# Patient Record
Sex: MALE | Race: WHITE | NOT HISPANIC OR LATINO | URBAN - METROPOLITAN AREA
[De-identification: names, ages, dates, MRNs, and addresses within clinical notes are randomized per-mention and may not be internally consistent; named-entity substitution may affect disease eponyms.]

---

## 2017-01-04 ENCOUNTER — EMERGENCY (EMERGENCY)
Facility: HOSPITAL | Age: 77
LOS: 1 days | Discharge: HOME | End: 2017-01-04
Attending: EMERGENCY MEDICINE
Payer: MEDICARE

## 2017-01-04 VITALS
HEIGHT: 65 IN | TEMPERATURE: 99 F | HEART RATE: 105 BPM | SYSTOLIC BLOOD PRESSURE: 104 MMHG | DIASTOLIC BLOOD PRESSURE: 67 MMHG | OXYGEN SATURATION: 96 % | RESPIRATION RATE: 20 BRPM | WEIGHT: 149.91 LBS

## 2017-01-04 DIAGNOSIS — Z79.82 LONG TERM (CURRENT) USE OF ASPIRIN: ICD-10-CM

## 2017-01-04 DIAGNOSIS — J11.1 INFLUENZA DUE TO UNIDENTIFIED INFLUENZA VIRUS WITH OTHER RESPIRATORY MANIFESTATIONS: ICD-10-CM

## 2017-01-04 DIAGNOSIS — M79.1 MYALGIA: ICD-10-CM

## 2017-01-04 DIAGNOSIS — K21.9 GASTRO-ESOPHAGEAL REFLUX DISEASE WITHOUT ESOPHAGITIS: ICD-10-CM

## 2017-01-04 DIAGNOSIS — E78.5 HYPERLIPIDEMIA, UNSPECIFIED: ICD-10-CM

## 2017-01-04 DIAGNOSIS — I10 ESSENTIAL (PRIMARY) HYPERTENSION: ICD-10-CM

## 2017-01-04 PROCEDURE — 99284 EMERGENCY DEPT VISIT MOD MDM: CPT

## 2017-01-05 VITALS
HEART RATE: 84 BPM | SYSTOLIC BLOOD PRESSURE: 113 MMHG | OXYGEN SATURATION: 98 % | TEMPERATURE: 98 F | DIASTOLIC BLOOD PRESSURE: 63 MMHG | RESPIRATION RATE: 18 BRPM

## 2017-01-05 LAB
ALBUMIN SERPL ELPH-MCNC: 4.4 G/DL — SIGNIFICANT CHANGE UP (ref 3.3–5.2)
ALP SERPL-CCNC: 53 U/L — SIGNIFICANT CHANGE UP (ref 40–120)
ALT FLD-CCNC: 12 U/L — SIGNIFICANT CHANGE UP
ANION GAP SERPL CALC-SCNC: 17 MMOL/L — SIGNIFICANT CHANGE UP (ref 5–17)
AST SERPL-CCNC: 23 U/L — SIGNIFICANT CHANGE UP
BASOPHILS # BLD AUTO: 0 K/UL — SIGNIFICANT CHANGE UP (ref 0–0.2)
BASOPHILS NFR BLD AUTO: 0 % — SIGNIFICANT CHANGE UP (ref 0–2)
BILIRUB SERPL-MCNC: 0.9 MG/DL — SIGNIFICANT CHANGE UP (ref 0.4–2)
BUN SERPL-MCNC: 26 MG/DL — HIGH (ref 8–20)
CALCIUM SERPL-MCNC: 8.8 MG/DL — SIGNIFICANT CHANGE UP (ref 8.6–10.2)
CHLORIDE SERPL-SCNC: 94 MMOL/L — LOW (ref 98–107)
CO2 SERPL-SCNC: 24 MMOL/L — SIGNIFICANT CHANGE UP (ref 22–29)
CREAT SERPL-MCNC: 1.42 MG/DL — HIGH (ref 0.5–1.3)
EOSINOPHIL # BLD AUTO: 0 K/UL — SIGNIFICANT CHANGE UP (ref 0–0.5)
GLUCOSE SERPL-MCNC: 110 MG/DL — SIGNIFICANT CHANGE UP (ref 70–115)
HCT VFR BLD CALC: 36 % — LOW (ref 42–52)
HGB BLD-MCNC: 12.6 G/DL — LOW (ref 14–18)
LACTATE BLDV-MCNC: 1.4 MMOL/L — SIGNIFICANT CHANGE UP (ref 0.7–2)
LYMPHOCYTES # BLD AUTO: 0.6 K/UL — LOW (ref 1–4.8)
LYMPHOCYTES # BLD AUTO: 10 % — LOW (ref 20–55)
MAGNESIUM SERPL-MCNC: 2 MG/DL — SIGNIFICANT CHANGE UP (ref 1.8–2.5)
MCHC RBC-ENTMCNC: 31.8 PG — HIGH (ref 27–31)
MCHC RBC-ENTMCNC: 35 G/DL — SIGNIFICANT CHANGE UP (ref 32–36)
MCV RBC AUTO: 90.9 FL — SIGNIFICANT CHANGE UP (ref 80–94)
MONOCYTES # BLD AUTO: 1.7 K/UL — HIGH (ref 0–0.8)
MONOCYTES NFR BLD AUTO: 5 % — SIGNIFICANT CHANGE UP (ref 3–10)
NEUTROPHILS # BLD AUTO: 7.2 K/UL — SIGNIFICANT CHANGE UP (ref 1.8–8)
NEUTROPHILS NFR BLD AUTO: 83 % — HIGH (ref 37–73)
NEUTS BAND # BLD: 2 % — SIGNIFICANT CHANGE UP (ref 0–8)
PHOSPHATE SERPL-MCNC: 6 MG/DL — HIGH (ref 2.4–4.7)
PLAT MORPH BLD: NORMAL — SIGNIFICANT CHANGE UP
PLATELET # BLD AUTO: 192 K/UL — SIGNIFICANT CHANGE UP (ref 150–400)
POTASSIUM SERPL-MCNC: 4.4 MMOL/L — SIGNIFICANT CHANGE UP (ref 3.5–5.3)
POTASSIUM SERPL-SCNC: 4.4 MMOL/L — SIGNIFICANT CHANGE UP (ref 3.5–5.3)
PROT SERPL-MCNC: 7.8 G/DL — SIGNIFICANT CHANGE UP (ref 6.6–8.7)
RBC # BLD: 3.96 M/UL — LOW (ref 4.6–6.2)
RBC # FLD: 12.7 % — SIGNIFICANT CHANGE UP (ref 11–15.6)
RBC BLD AUTO: NORMAL — SIGNIFICANT CHANGE UP
SODIUM SERPL-SCNC: 135 MMOL/L — SIGNIFICANT CHANGE UP (ref 135–145)
WBC # BLD: 9.08 K/UL — SIGNIFICANT CHANGE UP (ref 4.8–10.8)
WBC # FLD AUTO: 9.08 K/UL — SIGNIFICANT CHANGE UP (ref 4.8–10.8)

## 2017-01-05 PROCEDURE — 83735 ASSAY OF MAGNESIUM: CPT

## 2017-01-05 PROCEDURE — 96374 THER/PROPH/DIAG INJ IV PUSH: CPT

## 2017-01-05 PROCEDURE — 84100 ASSAY OF PHOSPHORUS: CPT

## 2017-01-05 PROCEDURE — 71046 X-RAY EXAM CHEST 2 VIEWS: CPT

## 2017-01-05 PROCEDURE — 80053 COMPREHEN METABOLIC PANEL: CPT

## 2017-01-05 PROCEDURE — 96375 TX/PRO/DX INJ NEW DRUG ADDON: CPT

## 2017-01-05 PROCEDURE — 71020: CPT | Mod: 26

## 2017-01-05 PROCEDURE — 99284 EMERGENCY DEPT VISIT MOD MDM: CPT | Mod: 25

## 2017-01-05 PROCEDURE — 85027 COMPLETE CBC AUTOMATED: CPT

## 2017-01-05 PROCEDURE — 83605 ASSAY OF LACTIC ACID: CPT

## 2017-01-05 RX ORDER — SODIUM CHLORIDE 9 MG/ML
1000 INJECTION INTRAMUSCULAR; INTRAVENOUS; SUBCUTANEOUS ONCE
Qty: 0 | Refills: 0 | Status: COMPLETED | OUTPATIENT
Start: 2017-01-05 | End: 2017-01-05

## 2017-01-05 RX ORDER — KETOROLAC TROMETHAMINE 30 MG/ML
30 SYRINGE (ML) INJECTION ONCE
Qty: 0 | Refills: 0 | Status: DISCONTINUED | OUTPATIENT
Start: 2017-01-05 | End: 2017-01-05

## 2017-01-05 RX ORDER — ONDANSETRON 8 MG/1
4 TABLET, FILM COATED ORAL ONCE
Qty: 0 | Refills: 0 | Status: COMPLETED | OUTPATIENT
Start: 2017-01-05 | End: 2017-01-05

## 2017-01-05 RX ADMIN — SODIUM CHLORIDE 1000 MILLILITER(S): 9 INJECTION INTRAMUSCULAR; INTRAVENOUS; SUBCUTANEOUS at 01:44

## 2017-01-05 RX ADMIN — Medication 30 MILLIGRAM(S): at 01:44

## 2017-01-05 RX ADMIN — Medication 30 MILLIGRAM(S): at 02:16

## 2017-01-05 RX ADMIN — ONDANSETRON 4 MILLIGRAM(S): 8 TABLET, FILM COATED ORAL at 01:44

## 2017-01-05 RX ADMIN — SODIUM CHLORIDE 1000 MILLILITER(S): 9 INJECTION INTRAMUSCULAR; INTRAVENOUS; SUBCUTANEOUS at 04:20

## 2017-01-05 NOTE — ED PROVIDER NOTE - PROGRESS NOTE DETAILS
pt was re-assessed, appears more comfortable, appears better, will continue hydration results were reviewed and discussed, advised to stay hydrated, f/u with pcp in 1-2 days. if any symptoms worsen or any new symptoms occur, return to Ed, pt verbalized understanding.

## 2017-01-05 NOTE — ED PROVIDER NOTE - CONSTITUTIONAL, MLM
normal... Well appearing, well nourished, awake, alert, oriented to person, place, time/situation and in no apparent distress. tired looking

## 2017-01-05 NOTE — ED PROVIDER NOTE - CONDUCTED A DETAILED DISCUSSION WITH PATIENT AND/OR GUARDIAN REGARDING, MDM
lab results/need for outpatient follow-up/radiology results/return to ED if symptoms worsen, persist or questions arise

## 2017-01-05 NOTE — ED PROVIDER NOTE - OBJECTIVE STATEMENT
77 y/o male was sent to ED from urgent care, pt was originally place on Zpack a week ago for URI, last dose was today. went ot [facility] for f.u today, + temp was noted, CXR report states clear lungs but "unsure" and was + for Flu A.  Pt complaining of muscle aches/anorexia/feeling tired. Denies dizziness/N/V/HA/CP/SOB. was given APAP at [facility].

## 2017-01-05 NOTE — ED ADULT NURSE NOTE - OBJECTIVE STATEMENT
pt alert and awake x3, arrived to ED with flu-like symptoms, states was sent from [facility] for eval, pt denies chest pain/sob, LS clear, has non productive dry cough, states has been sick for a couple weeks.

## 2017-01-05 NOTE — ED PROVIDER NOTE - ATTENDING CONTRIBUTION TO CARE
Patient recently diagnosed with flu out of window for treatment c/o worsening body aches and generalized weakness.  Patient in no respiratory distress.  Vitals stable.  Xray negative for acute pathology.  He was instructed to follow-up with PMD.

## 2020-07-02 ENCOUNTER — INPATIENT (INPATIENT)
Facility: HOSPITAL | Age: 80
LOS: 0 days | Discharge: HOME | DRG: 311 | End: 2020-07-03
Attending: HOSPITALIST | Admitting: INTERNAL MEDICINE
Payer: MEDICARE

## 2020-07-02 VITALS
WEIGHT: 160.06 LBS | RESPIRATION RATE: 19 BRPM | HEART RATE: 50 BPM | HEIGHT: 65 IN | TEMPERATURE: 98 F | SYSTOLIC BLOOD PRESSURE: 115 MMHG | DIASTOLIC BLOOD PRESSURE: 48 MMHG

## 2020-07-02 LAB
ALBUMIN SERPL ELPH-MCNC: 4.2 G/DL — SIGNIFICANT CHANGE UP (ref 3.3–5.2)
ALP SERPL-CCNC: 47 U/L — SIGNIFICANT CHANGE UP (ref 40–120)
ALT FLD-CCNC: 18 U/L — SIGNIFICANT CHANGE UP
ANION GAP SERPL CALC-SCNC: 15 MMOL/L — SIGNIFICANT CHANGE UP (ref 5–17)
APPEARANCE UR: CLEAR — SIGNIFICANT CHANGE UP
AST SERPL-CCNC: 20 U/L — SIGNIFICANT CHANGE UP
BACTERIA # UR AUTO: ABNORMAL
BASE EXCESS BLDV CALC-SCNC: 1.1 MMOL/L — SIGNIFICANT CHANGE UP (ref -2–2)
BASOPHILS # BLD AUTO: 0.07 K/UL — SIGNIFICANT CHANGE UP (ref 0–0.2)
BASOPHILS NFR BLD AUTO: 0.8 % — SIGNIFICANT CHANGE UP (ref 0–2)
BILIRUB SERPL-MCNC: 0.3 MG/DL — LOW (ref 0.4–2)
BILIRUB UR-MCNC: NEGATIVE — SIGNIFICANT CHANGE UP
BUN SERPL-MCNC: 18 MG/DL — SIGNIFICANT CHANGE UP (ref 8–20)
CA-I SERPL-SCNC: 1.08 MMOL/L — LOW (ref 1.15–1.33)
CALCIUM SERPL-MCNC: 9.1 MG/DL — SIGNIFICANT CHANGE UP (ref 8.6–10.2)
CHLORIDE BLDV-SCNC: 108 MMOL/L — HIGH (ref 98–107)
CHLORIDE SERPL-SCNC: 103 MMOL/L — SIGNIFICANT CHANGE UP (ref 98–107)
CO2 SERPL-SCNC: 22 MMOL/L — SIGNIFICANT CHANGE UP (ref 22–29)
COLOR SPEC: YELLOW — SIGNIFICANT CHANGE UP
CREAT SERPL-MCNC: 1.11 MG/DL — SIGNIFICANT CHANGE UP (ref 0.5–1.3)
D DIMER BLD IA.RAPID-MCNC: <150 NG/ML DDU — SIGNIFICANT CHANGE UP
DIFF PNL FLD: ABNORMAL
EOSINOPHIL # BLD AUTO: 0.74 K/UL — HIGH (ref 0–0.5)
EOSINOPHIL NFR BLD AUTO: 8 % — HIGH (ref 0–6)
EPI CELLS # UR: SIGNIFICANT CHANGE UP
GAS PNL BLDV: 141 MMOL/L — SIGNIFICANT CHANGE UP (ref 135–145)
GAS PNL BLDV: SIGNIFICANT CHANGE UP
GAS PNL BLDV: SIGNIFICANT CHANGE UP
GLUCOSE BLDV-MCNC: 98 MG/DL — SIGNIFICANT CHANGE UP (ref 70–99)
GLUCOSE SERPL-MCNC: 102 MG/DL — HIGH (ref 70–99)
GLUCOSE UR QL: NEGATIVE MG/DL — SIGNIFICANT CHANGE UP
HCO3 BLDV-SCNC: 25 MMOL/L — SIGNIFICANT CHANGE UP (ref 21–29)
HCT VFR BLD CALC: 36.3 % — LOW (ref 39–50)
HCT VFR BLDA CALC: 39 — SIGNIFICANT CHANGE UP (ref 39–50)
HGB BLD CALC-MCNC: 12.8 G/DL — LOW (ref 13–17)
HGB BLD-MCNC: 12.8 G/DL — LOW (ref 13–17)
IMM GRANULOCYTES NFR BLD AUTO: 0.2 % — SIGNIFICANT CHANGE UP (ref 0–1.5)
KETONES UR-MCNC: NEGATIVE — SIGNIFICANT CHANGE UP
LACTATE BLDV-MCNC: 0.9 MMOL/L — SIGNIFICANT CHANGE UP (ref 0.5–2)
LEUKOCYTE ESTERASE UR-ACNC: NEGATIVE — SIGNIFICANT CHANGE UP
LIDOCAIN IGE QN: 41 U/L — SIGNIFICANT CHANGE UP (ref 22–51)
LYMPHOCYTES # BLD AUTO: 2.2 K/UL — SIGNIFICANT CHANGE UP (ref 1–3.3)
LYMPHOCYTES # BLD AUTO: 23.7 % — SIGNIFICANT CHANGE UP (ref 13–44)
MAGNESIUM SERPL-MCNC: 2.1 MG/DL — SIGNIFICANT CHANGE UP (ref 1.6–2.6)
MCHC RBC-ENTMCNC: 32.8 PG — SIGNIFICANT CHANGE UP (ref 27–34)
MCHC RBC-ENTMCNC: 35.3 GM/DL — SIGNIFICANT CHANGE UP (ref 32–36)
MCV RBC AUTO: 93.1 FL — SIGNIFICANT CHANGE UP (ref 80–100)
MONOCYTES # BLD AUTO: 0.82 K/UL — SIGNIFICANT CHANGE UP (ref 0–0.9)
MONOCYTES NFR BLD AUTO: 8.8 % — SIGNIFICANT CHANGE UP (ref 2–14)
NEUTROPHILS # BLD AUTO: 5.43 K/UL — SIGNIFICANT CHANGE UP (ref 1.8–7.4)
NEUTROPHILS NFR BLD AUTO: 58.5 % — SIGNIFICANT CHANGE UP (ref 43–77)
NITRITE UR-MCNC: NEGATIVE — SIGNIFICANT CHANGE UP
NT-PROBNP SERPL-SCNC: 1110 PG/ML — HIGH (ref 0–300)
OTHER CELLS CSF MANUAL: 17 ML/DL — LOW (ref 18–22)
PCO2 BLDV: 39 MMHG — SIGNIFICANT CHANGE UP (ref 35–50)
PH BLDV: 7.42 — SIGNIFICANT CHANGE UP (ref 7.32–7.43)
PH UR: 6 — SIGNIFICANT CHANGE UP (ref 5–8)
PHOSPHATE SERPL-MCNC: 3.5 MG/DL — SIGNIFICANT CHANGE UP (ref 2.4–4.7)
PLATELET # BLD AUTO: 217 K/UL — SIGNIFICANT CHANGE UP (ref 150–400)
PO2 BLDV: 116 MMHG — HIGH (ref 25–45)
POTASSIUM BLDV-SCNC: 3.8 MMOL/L — SIGNIFICANT CHANGE UP (ref 3.4–4.5)
POTASSIUM SERPL-MCNC: 4.1 MMOL/L — SIGNIFICANT CHANGE UP (ref 3.5–5.3)
POTASSIUM SERPL-SCNC: 4.1 MMOL/L — SIGNIFICANT CHANGE UP (ref 3.5–5.3)
PROT SERPL-MCNC: 6.6 G/DL — SIGNIFICANT CHANGE UP (ref 6.6–8.7)
PROT UR-MCNC: NEGATIVE MG/DL — SIGNIFICANT CHANGE UP
RBC # BLD: 3.9 M/UL — LOW (ref 4.2–5.8)
RBC # FLD: 12.5 % — SIGNIFICANT CHANGE UP (ref 10.3–14.5)
RBC CASTS # UR COMP ASSIST: SIGNIFICANT CHANGE UP /HPF (ref 0–4)
SAO2 % BLDV: 99 % — SIGNIFICANT CHANGE UP
SODIUM SERPL-SCNC: 140 MMOL/L — SIGNIFICANT CHANGE UP (ref 135–145)
SP GR SPEC: 1.01 — SIGNIFICANT CHANGE UP (ref 1.01–1.02)
TROPONIN T SERPL-MCNC: <0.01 NG/ML — SIGNIFICANT CHANGE UP (ref 0–0.06)
UROBILINOGEN FLD QL: NEGATIVE MG/DL — SIGNIFICANT CHANGE UP
WBC # BLD: 9.28 K/UL — SIGNIFICANT CHANGE UP (ref 3.8–10.5)
WBC # FLD AUTO: 9.28 K/UL — SIGNIFICANT CHANGE UP (ref 3.8–10.5)
WBC UR QL: SIGNIFICANT CHANGE UP

## 2020-07-02 PROCEDURE — 93010 ELECTROCARDIOGRAM REPORT: CPT

## 2020-07-02 PROCEDURE — 99285 EMERGENCY DEPT VISIT HI MDM: CPT | Mod: CS

## 2020-07-02 PROCEDURE — 71045 X-RAY EXAM CHEST 1 VIEW: CPT | Mod: 26

## 2020-07-02 NOTE — ED ADULT NURSE NOTE - CHPI ED NUR SYMPTOMS NEG
no congestion/no fever/no nausea/no syncope/no diaphoresis/no vomiting/no back pain/no chills/no shortness of breath

## 2020-07-02 NOTE — ED PROVIDER NOTE - CLINICAL SUMMARY MEDICAL DECISION MAKING FREE TEXT BOX
Pt with generalized weakness for the last few days, ECG with rate in the 40s, irregular, having sinus pauses or couplet PACs. Electrolytes WNL, first troponin negative, aside from bradycardia no other ischemic changes on ECG. Concern for sinus node dysfunction, will have cardiology evaluate to recommend further EP work up. Will keep on telemetry in observation. Lyme and tick panel sent to rule out lyme carditis.

## 2020-07-02 NOTE — ED PROVIDER NOTE - OBJECTIVE STATEMENT
79y/o M with PMHx of HTN (Metoprolol, HCTZ), HLD and GERD presents to the ED c/o dizziness which began a few days ago. Assoc. sx of fatigue and generalized weakness. Pt additionally c/o mild intermittent CP which feels like heart burn. No alleviating/exacerbating fx. Pt is currently Wes in 40's on monitor. Pt reports that he was bit by a tick, appreciated erythema but no target sign. He reports that he presented to  for sx, was recommended to f/u at ED for further evaul. No known sick/covid contacts. No prior cardiac hx. Denies SOB/BARCENAS, HA, urinary complaints or fever. 81y/o M with PMHx of HTN, HLD and GERD presents to the ED c/o weakness. He states that he has been feeling generally unwell for the past few days, with easy fatigue and general weakness. Does not recall a specific onset of symptoms or provoking factor. Endorses intermittent burning type chest pain, occasional palpitations. Seen initially at urgent care, referred to ED for cardiology evaluation. He has otherwise been in usual state of health, denies any recent illness. Reports a tick bite a few weeks ago, had some surrounding erythema but denies any target rash. No other cardiac history.

## 2020-07-02 NOTE — ED ADULT TRIAGE NOTE - CHIEF COMPLAINT QUOTE
80 year old male sent in by cardiologist secondary to episode of chest pain dry mouth dizziness with cough started yesterday as per patient he is feeling better as this time. Denies nausea and vomiting. 80 year old male sent in by cardiologist secondary to episode of chest pain dry mouth dizziness with cough started yesterday as per patient he is feeling better as this time. Denies nausea and vomiting. MD BLANCHARD notified of patient

## 2020-07-02 NOTE — ED ADULT NURSE NOTE - OBJECTIVE STATEMENT
mid sternal cp associated with dizziness intermittent since last night. denies symptoms at time of assessment. a and o x3. breathing even and unlabored. sinus dre, hr in 40-50s on cm. pt sitting calm in bed. will continue to monitor.

## 2020-07-02 NOTE — ED PROVIDER NOTE - CHPI ED SYMPTOMS NEG
no shortness of breath/No HA. No urinary complaints./no fever no shortness of breath/no fever/no chills

## 2020-07-02 NOTE — ED ADULT NURSE NOTE - PSH
Quality 110: Preventive Care And Screening: Influenza Immunization: Influenza Immunization previously received during influenza season
Quality 111:Pneumonia Vaccination Status For Older Adults: Pneumococcal Vaccination not Administered or Previously Received, Reason not Otherwise Specified
Detail Level: Detailed
No significant past surgical history

## 2020-07-02 NOTE — ED ADULT NURSE NOTE - CHIEF COMPLAINT QUOTE
80 year old male sent in by cardiologist secondary to episode of chest pain dry mouth dizziness with cough started yesterday as per patient he is feeling better as this time. Denies nausea and vomiting. MD BLANCHARD notified of patient

## 2020-07-03 VITALS — HEART RATE: 56 BPM

## 2020-07-03 DIAGNOSIS — R00.1 BRADYCARDIA, UNSPECIFIED: ICD-10-CM

## 2020-07-03 LAB
B BURGDOR C6 AB SER-ACNC: NEGATIVE — SIGNIFICANT CHANGE UP
B BURGDOR IGG+IGM SER-ACNC: 0.49 INDEX — SIGNIFICANT CHANGE UP (ref 0.01–0.89)
SARS-COV-2 RNA SPEC QL NAA+PROBE: SIGNIFICANT CHANGE UP
TROPONIN T SERPL-MCNC: <0.01 NG/ML — SIGNIFICANT CHANGE UP (ref 0–0.06)
TROPONIN T SERPL-MCNC: <0.01 NG/ML — SIGNIFICANT CHANGE UP (ref 0–0.06)

## 2020-07-03 PROCEDURE — 99285 EMERGENCY DEPT VISIT HI MDM: CPT | Mod: 25

## 2020-07-03 PROCEDURE — 83735 ASSAY OF MAGNESIUM: CPT

## 2020-07-03 PROCEDURE — 99233 SBSQ HOSP IP/OBS HIGH 50: CPT

## 2020-07-03 PROCEDURE — 82947 ASSAY GLUCOSE BLOOD QUANT: CPT

## 2020-07-03 PROCEDURE — 84100 ASSAY OF PHOSPHORUS: CPT

## 2020-07-03 PROCEDURE — 99236 HOSP IP/OBS SAME DATE HI 85: CPT | Mod: CS

## 2020-07-03 PROCEDURE — G0378: CPT

## 2020-07-03 PROCEDURE — 83605 ASSAY OF LACTIC ACID: CPT

## 2020-07-03 PROCEDURE — 81001 URINALYSIS AUTO W/SCOPE: CPT

## 2020-07-03 PROCEDURE — 85014 HEMATOCRIT: CPT

## 2020-07-03 PROCEDURE — 84484 ASSAY OF TROPONIN QUANT: CPT

## 2020-07-03 PROCEDURE — 86666 EHRLICHIA ANTIBODY: CPT

## 2020-07-03 PROCEDURE — 86617 LYME DISEASE ANTIBODY: CPT

## 2020-07-03 PROCEDURE — 84295 ASSAY OF SERUM SODIUM: CPT

## 2020-07-03 PROCEDURE — U0003: CPT

## 2020-07-03 PROCEDURE — 99223 1ST HOSP IP/OBS HIGH 75: CPT

## 2020-07-03 PROCEDURE — 93005 ELECTROCARDIOGRAM TRACING: CPT

## 2020-07-03 PROCEDURE — 82330 ASSAY OF CALCIUM: CPT

## 2020-07-03 PROCEDURE — 86753 PROTOZOA ANTIBODY NOS: CPT

## 2020-07-03 PROCEDURE — 83880 ASSAY OF NATRIURETIC PEPTIDE: CPT

## 2020-07-03 PROCEDURE — 93010 ELECTROCARDIOGRAM REPORT: CPT | Mod: 76

## 2020-07-03 PROCEDURE — 83690 ASSAY OF LIPASE: CPT

## 2020-07-03 PROCEDURE — 36415 COLL VENOUS BLD VENIPUNCTURE: CPT

## 2020-07-03 PROCEDURE — 85379 FIBRIN DEGRADATION QUANT: CPT

## 2020-07-03 PROCEDURE — 82435 ASSAY OF BLOOD CHLORIDE: CPT

## 2020-07-03 PROCEDURE — 84132 ASSAY OF SERUM POTASSIUM: CPT

## 2020-07-03 PROCEDURE — 99497 ADVNCD CARE PLAN 30 MIN: CPT | Mod: 25

## 2020-07-03 PROCEDURE — C8929: CPT

## 2020-07-03 PROCEDURE — 87086 URINE CULTURE/COLONY COUNT: CPT

## 2020-07-03 PROCEDURE — 85027 COMPLETE CBC AUTOMATED: CPT

## 2020-07-03 PROCEDURE — 80053 COMPREHEN METABOLIC PANEL: CPT

## 2020-07-03 PROCEDURE — 82803 BLOOD GASES ANY COMBINATION: CPT

## 2020-07-03 PROCEDURE — 71045 X-RAY EXAM CHEST 1 VIEW: CPT

## 2020-07-03 PROCEDURE — 86618 LYME DISEASE ANTIBODY: CPT

## 2020-07-03 PROCEDURE — 86769 SARS-COV-2 COVID-19 ANTIBODY: CPT

## 2020-07-03 RX ORDER — PANTOPRAZOLE SODIUM 20 MG/1
40 TABLET, DELAYED RELEASE ORAL
Refills: 0 | Status: DISCONTINUED | OUTPATIENT
Start: 2020-07-03 | End: 2020-07-03

## 2020-07-03 RX ORDER — ASPIRIN/CALCIUM CARB/MAGNESIUM 324 MG
1 TABLET ORAL
Qty: 0 | Refills: 0 | DISCHARGE
Start: 2020-07-03

## 2020-07-03 RX ORDER — LANOLIN ALCOHOL/MO/W.PET/CERES
3 CREAM (GRAM) TOPICAL AT BEDTIME
Refills: 0 | Status: DISCONTINUED | OUTPATIENT
Start: 2020-07-03 | End: 2020-07-03

## 2020-07-03 RX ORDER — ACETAMINOPHEN 500 MG
650 TABLET ORAL EVERY 6 HOURS
Refills: 0 | Status: DISCONTINUED | OUTPATIENT
Start: 2020-07-03 | End: 2020-07-03

## 2020-07-03 RX ORDER — HEPARIN SODIUM 5000 [USP'U]/ML
5000 INJECTION INTRAVENOUS; SUBCUTANEOUS EVERY 8 HOURS
Refills: 0 | Status: DISCONTINUED | OUTPATIENT
Start: 2020-07-03 | End: 2020-07-03

## 2020-07-03 RX ORDER — LISINOPRIL 2.5 MG/1
20 TABLET ORAL DAILY
Refills: 0 | Status: DISCONTINUED | OUTPATIENT
Start: 2020-07-03 | End: 2020-07-03

## 2020-07-03 RX ORDER — ASPIRIN/CALCIUM CARB/MAGNESIUM 324 MG
81 TABLET ORAL DAILY
Refills: 0 | Status: DISCONTINUED | OUTPATIENT
Start: 2020-07-03 | End: 2020-07-03

## 2020-07-03 RX ORDER — METOPROLOL TARTRATE 50 MG
50 TABLET ORAL
Refills: 0 | Status: DISCONTINUED | OUTPATIENT
Start: 2020-07-03 | End: 2020-07-03

## 2020-07-03 RX ORDER — HYDROCHLOROTHIAZIDE 25 MG
12.5 TABLET ORAL DAILY
Refills: 0 | Status: DISCONTINUED | OUTPATIENT
Start: 2020-07-03 | End: 2020-07-03

## 2020-07-03 RX ADMIN — HEPARIN SODIUM 5000 UNIT(S): 5000 INJECTION INTRAVENOUS; SUBCUTANEOUS at 14:29

## 2020-07-03 RX ADMIN — Medication 50 MILLIGRAM(S): at 17:08

## 2020-07-03 RX ADMIN — Medication 81 MILLIGRAM(S): at 11:17

## 2020-07-03 NOTE — ED ADULT NURSE REASSESSMENT NOTE - NS ED NURSE REASSESS COMMENT FT1
Patient a/ox4 denying pain/chest discomfort. Sinus dre on monitor HR 45 other VSS. Cardio at bedside pt to be kept NPO #20 IV LFA plan of care reviewed

## 2020-07-03 NOTE — PROGRESS NOTE ADULT - SUBJECTIVE AND OBJECTIVE BOX
Patient preference to be discharged and follow up as outpt.   negative cardiac enzymes. no wall motion abnormality on echo.   Moderate AI but no LV dilation.   Will have patient follow up in office.

## 2020-07-03 NOTE — ED CDU PROVIDER INITIAL DAY NOTE - OBJECTIVE STATEMENT
81y/o M with PMHx of HTN (Metoprolol, HCTZ), HLD and GERD presents to the ED c/o dizziness which began a few days ago. Assoc. sx of fatigue and generalized weakness. Pt additionally c/o mild intermittent CP which feels like heart burn. No alleviating/exacerbating fx. Pt is currently Wes in 40's on monitor. Pt reports that he was bit by a tick to R chest approx 1 month ago, appreciated erythema but no target sign. He reports that he presented to  for sx, was recommended to f/u at ED for further eval. No known sick/covid contacts. No prior cardiac hx. Denies SOB/BARCENAS, HA, urinary complaints or fever. 81y/o M with PMHx of HTN, HLD and GERD presents to the ED c/o weakness. He states that he has been feeling generally unwell for the past few days, with easy fatigue and general weakness. Does not recall a specific onset of symptoms or provoking factor. Endorses intermittent burning type chest pain, occasional palpitations. Seen initially at urgent care, referred to ED for cardiology evaluation. He has otherwise been in usual state of health, denies any recent illness. Reports a tick bite a few weeks ago, had some surrounding erythema but denies any target rash. No other cardiac history.

## 2020-07-03 NOTE — CONSULT NOTE ADULT - SUBJECTIVE AND OBJECTIVE BOX
[facility]                                                               Office:  [address] [address]                                                              Telephone: [phone]. Fax:[phone]                                                                        CARDIOLOGY CONSULTATION NOTE                                                                                             Consult requested by:  Dr. Faria	  Reason for Consultation: Chest Pain  History obtained by: Patient and medical record   obtained: No    Chief complaint:    Patient is a 80y old  Male who presents with a chief complaint of chest pain.       HPI: 81 y/o M with a PMHx of HTN, HLD, and GERD who presented to the ED with complaitns of weakness, dizziness, and chest burning. Patient states he hasn't been feeling well in general for th last few days, but yesterday afternoon he walked outside to talk to his wife. By the time he walked back into the house he was feeling week, dizzy, and with some chest burning. Patient states that the chest burning was 1/10, similar to his GERD symptoms. Patient states he went to an Urgent care who did an EKG, and referred him to the ED. Patient also notes recent tick bite, but lyme's panel in the ED was negative. Patient states his METS>4, but now with fatigue when going upstairs. Patient denies fevers, chills, SOB, orthopnea, PND, LE edema, abdominal pain, N/V/D, headache, or vision changes.     REVIEW OF SYMPTOMS:     CONSTITUTIONAL: No fever, weight loss, or fatigue  ENMT:  No difficulty hearing, tinnitus, vertigo; No sinus or throat pain  NECK: No pain or stiffness  CARDIOVASCULAR: AS PER HPI  RESPIRATORY: Dyspnea on exertion, Shortness of breath, cough, wheezing  : No dysuria, no hematuria   GI: No dark color stool, no melena, no diarrhea, no constipation, no abdominal pain   NEURO: AS PER HPI   MUSCULOSKELETAL: No joint pain or swelling; No muscle, back, or extremity pain  PSYCH: No agitation, no anxiety.    ALL OTHER REVIEW OF SYSTEMS ARE NEGATIVE.      PREVIOUS DIAGNOSTIC TESTING  ECHO FINDINGS: Pending    ALLERGIES: Allergies    No Known Allergies    Intolerances      PAST MEDICAL HISTORY  Hyperlipidemia  GERD (gastroesophageal reflux disease)  HTN (hypertension)      PAST SURGICAL HISTORY  No significant past surgical history      FAMILY HISTORY:  No pertinent family history in first degree relatives      SOCIAL HISTORY:  Lives with his wife.   CIGARETTES:   Former smoker, quit 50 years ago. Smoked 1 PPD x 10 years.   ALCOHOL: Drinks wine with dinner.   DRUGS: Denies      CURRENT MEDICATIONS:  hydrochlorothiazide 12.5 milliGRAM(s) Oral daily     pantoprazole    Tablet  aspirin  chewable      HOME MEDICATIONS:  Home Medications:  aspirin 81 mg oral tablet: 1 tab(s) orally once a day (2020 21:14)  capsaicin 0.025% topical cream: Apply topically to affected area 3 times a day (2020 21:14)  clotrimazole 1% topical solution: Apply topically to affected area 2 times a day (2020 21:14)  folic acid 1 mg oral tablet: 1 tab(s) orally once a day (2020 21:14)  lisinopril-hydrochlorothiazide 20 mg-12.5 mg oral tablet: 1 tab(s) orally once a day (:14)  metoprolol tartrate 100 mg oral tablet: 1 tab(s) orally 2 times a day (2020 21:14)  omeprazole 20 mg oral delayed release capsule: 1 cap(s) orally 2 times a day (:14)      Vital Signs Last 24 Hrs  T(C): 36.5 (2020 07:33), Max: 36.7 (2020 20:03)  T(F): 97.7 (2020 07:33), Max: 98 (2020 20:03)  HR: 45 (2020 07:33) (45 - 50)  BP: 131/62 (2020 07:33) (115/48 - 147/56)  RR: 18 (2020 07:33) (18 - 19)  SpO2: 97% (2020 07:33) (96% - 97%)      PHYSICAL EXAM:  Constitutional: Comfortable . No acute distress.   HEENT: Atraumatic and normocephalic , neck is supple . no JVD. No carotid bruit. PEERL   CNS: A&Ox3. No focal deficits. EOMI. Cranial nerves II-IX are intact.   Lymph Nodes: Cervical : Not palpable.  Respiratory: CTAB  Cardiovascular: S1S2 bradycardic. No rubs or gallop. II/Vi systolic murmur lsb  Gastrointestinal: Soft non-tender and non distended . +Bowel sounds. negative Martinez's sign.  Extremities: No edema.   Psychiatric: Calm . no agitation.  Skin: No skin rash/ulcers visualized to face, hands or feet.    Intake and output:     LABS:                        12.8   9.28  )-----------( 217      ( 2020 21:06 )             36.3     07-    140  |  103  |  18.0  ----------------------------<  102<H>  4.1   |  22.0  |  1.11    Ca    9.1      2020 21:06  Phos  3.5     -  Mg     2.1     -    TPro  6.6  /  Alb  4.2  /  TBili  0.3<L>  /  DBili  x   /  AST  20  /  ALT  18  /  AlkPhos  47      CARDIAC MARKERS ( 2020 03:19 )  x     / <0.01 ng/mL / x     / x     / x      CARDIAC MARKERS ( 2020 00:56 )  x     / <0.01 ng/mL / x     / x     / x      CARDIAC MARKERS ( 2020 21:06 )  x     / <0.01 ng/mL / x     / x     / x        ;p-BNP=Serum Pro-Brain Natriuretic Peptide: 1110 pg/mL ( @ 21:06)      Urinalysis Basic - ( 2020 21:41 )    Color: Yellow / Appearance: Clear / S.010 / pH: x  Gluc: x / Ketone: Negative  / Bili: Negative / Urobili: Negative mg/dL   Blood: x / Protein: Negative mg/dL / Nitrite: Negative   Leuk Esterase: Negative / RBC: 0-2 /HPF / WBC 0-2   Sq Epi: x / Non Sq Epi: Occasional / Bacteria: Occasional        INTERPRETATION OF TELEMETRY: Reviewed by me. SB with PACs  ECG: Reviewed by me. SB, PACs, NSST/T wave abnormalities     RADIOLOGY & ADDITIONAL STUDIES:    X-ray:  reviewed by me.   < from: Xray Chest 1 View AP/PA. (20 @ 21:49) >   EXAM:  XR CHEST AP OR PA 1V                          PROCEDURE DATE:  2020          INTERPRETATION:  AP chest on 2020 at 9:43 PM. Patient has chest pain. There is history of hypertension and GERD.    Elevated right hemidiaphragm again noted.    Heart suggest normal size.    There is some mild right base atelectatic change.    Chest is similar to 2017.    IMPRESSION: Linear atelectasis at right base again noted.    < end of copied text >

## 2020-07-03 NOTE — DISCHARGE NOTE PROVIDER - NSDCMRMEDTOKEN_GEN_ALL_CORE_FT
aspirin 81 mg oral tablet, chewable: 1 tab(s) orally once a day  capsaicin 0.025% topical cream: Apply topically to affected area 3 times a day  clotrimazole 1% topical solution: Apply topically to affected area 2 times a day  folic acid 1 mg oral tablet: 1 tab(s) orally once a day  lisinopril-hydrochlorothiazide 20 mg-12.5 mg oral tablet: 1 tab(s) orally once a day  metoprolol tartrate 100 mg oral tablet: 1 tab(s) orally 2 times a day  omeprazole 20 mg oral delayed release capsule: 1 cap(s) orally 2 times a day

## 2020-07-03 NOTE — ED CDU PROVIDER DISPOSITION NOTE - ATTENDING CONTRIBUTION TO CARE
I, Karissa Barbosa MD have personally performed a face to face diagnostic evaluation on this patient.  I have reviewed the ACP note and agree with the history, exam, and plan of care, except as noted.     Exam:  Head: atraumatic, normacephalic  Face: atraumatic, no crepitus no orbiral/maxillary/mandibular ttp  throat: uvula midline no exudates  eyes: perrla eomi  heart: rrr s1s2  lungs: ctab  abd: soft, nt nd +bs no rebound/guarding no cva ttp  skin: warm  LE: no swelling, no calf ttp  back: no midline cervical/thoracic/lumbar ttp    --pt in obs for bradycardia weakness evaluated by cards will admit for echo and possible sress vs cath; currently pt has no complaints---admit

## 2020-07-03 NOTE — H&P ADULT - NSHPPHYSICALEXAM_GEN_ALL_CORE
T(C): 36.8 (07-03-20 @ 11:09), Max: 36.8 (07-03-20 @ 11:09)  HR: 55 (07-03-20 @ 11:09) (45 - 55)  BP: 128/59 (07-03-20 @ 11:09) (115/48 - 147/56)  RR: 18 (07-03-20 @ 11:09) (18 - 19)  SpO2: 96% (07-03-20 @ 11:09) (96% - 97%)    PHYSICAL EXAM: evaluation precludes physical exam. Pertinent physical exam findings as per video conference with  teleNA at bedside is as follows            ) T(C): 36.8 (07-03-20 @ 11:09), Max: 36.8 (07-03-20 @ 11:09)  HR: 55 (07-03-20 @ 11:09) (45 - 55)  BP: 128/59 (07-03-20 @ 11:09) (115/48 - 147/56)  RR: 18 (07-03-20 @ 11:09) (18 - 19)  SpO2: 96% (07-03-20 @ 11:09) (96% - 97%)    PHYSICAL EXAM: evaluation precludes physical exam. Pertinent physical exam findings as per video conference with  teleNA at bedside is as follows    --------------------ATTENDING EXAM BELOW----------------------------  T(C): 36.8 (07-03-20 @ 11:09), Max: 36.8 (07-03-20 @ 11:09)  HR: 55 (07-03-20 @ 11:09) (45 - 55)  BP: 128/59 (07-03-20 @ 11:09) (115/48 - 147/56)  RR: 18 (07-03-20 @ 11:09) (18 - 19)  SpO2: 96% (07-03-20 @ 11:09) (96% - 97%)    GEN - appears age appropriate. well nourished. pleasant. no distress.   HEENT - NCAT, EOMI, PB  RESP - CTA BL, no wheeze/stridor/rhonchi/crackles. not on supplemental O2.  CARDIO - NS1S2, RRR. No murmurs/rubs/gallops.  ABD - Soft/Non tender/Non distended. Normal BS x4 quadrants.   Ext - No VICKY. no signs of venous/arterial stasis ulcers  MSK - full ROM of BL upper and lower extremities without pain or restriction. BL 5/5 strength on upper and lower extremities.   Neuro - cn 2-12 grossly intact. no visible seizure activity appreciated. no tremor. gait not observed.   Skin - clean, dry, intact. no rashes or lesions.    Psych- AAOx3. no suicidal/homicidal ideation. appropriate behaviour. attentive. normal affect.          )

## 2020-07-03 NOTE — ED CDU PROVIDER INITIAL DAY NOTE - PHYSICAL EXAMINATION
Gen: WD/WN, NAD, non-toxic  Head: NCAT  Neck:. Soft and supple. FROM, no midline ttp  Cardiac: Bradycardic rhythm, +S1S2.   Resp: Speaking in full sentences. No evidence of respiratory distress. No wheezes, rales or rhonchi.  Abd: +BS x 4. Soft, non-tender, non-distended. No guarding or rebound.  MSK: FROM extremities x 4, no bony ttp  Skin: Warm, dry, no rashes or lesions  Neuro: Awake, alert & oriented x 3. No focal deficits, ambulatory with steady gait

## 2020-07-03 NOTE — H&P ADULT - ATTENDING COMMENTS
VTE  sq heparin  med rec done w pt      Folllow up   echo  CIVID yesterday at urgent care; today in [facility] VTE  sq heparin  med rec done w pt      Folllow up   echo  CIVID yesterday at urgent care; today in [facility]  ------------------------ATTENDING ATTESTATION BELOW--------------------------------------  Patient seen and examined at the bedside. I was physically present for key portions of the evaluation and management services provided. Agree with the above history, physical, assessment, and plan with the necessary amendments/elaborations below:    clinically stable. currently asymptomatic. suspect stable angina. tele. echo pending report. npo @ midnight on sunday for nst vs. cath on monday pending echo results.    sinus bradycardia, mild, asymptomatic, likely sec to bb. ss cardio appreciated, dose lowered, monitor for improvement.

## 2020-07-03 NOTE — ED CDU PROVIDER DISPOSITION NOTE - CLINICAL COURSE
81y/o M with PMHx of HTN, HLD and GERD presents to the ED c/o weakness. He states that he has been feeling generally unwell for the past few days, with easy fatigue and general weakness. Does not recall a specific onset of symptoms or provoking factor. Pt placed in CDU for serial trops / EKGs and cardiac consult. Trops WNL. Pt evaluated by cardiology who advised pt be admitted to hospitalist service pending TTE and further ischemic workup.

## 2020-07-03 NOTE — ED CDU PROVIDER INITIAL DAY NOTE - NS ED ROS FT
Gen: +FATIGUE, GENERALIZED WEAKNESS. denies fever, chills, weight loss  Skin: denies rashes, laceration, bruising  HEENT: denies visual changes, ear pain, nasal congestion, throat pain  Respiratory: denies BARCENAS, SOB, cough, wheezing  Cardiovascular: +CP. denies palpitations, diaphoresis, LE edema  GI: denies abdominal pain, n/v/d  : denies dysuria, frequency, urgency, bowel/bladder incontinence  MSK: denies joint swelling/pain, back pain, neck pain  Neuro: denies headache, dizziness, weakness, numbness  Psych: denies anxiety, depression, SI/HI, visual/auditory hallucinations

## 2020-07-03 NOTE — DISCHARGE NOTE PROVIDER - CARE PROVIDER_API CALL
Ramon Jacobs  CARDIOVASCULAR DISEASE  [address] [place] [address]  Phone: [phone]  Fax: [phone]  Follow Up Time:

## 2020-07-03 NOTE — H&P ADULT - ASSESSMENT
80 year old male w GERD, HLD, HTN presented to ED c/o weakness, chest discomfort      #Chest discomfort  #SOB/[place]/ weakness  concerning for ischemia  trop neg x 3  1. admit telemetry  2. echo done w results pending  3. cardiology following  4. for nuclear stress      #Sinus dre  in 50s on presentation  down to 40;s  1. decrease beta blocker      #GERD  1/ omeprazole interchange to protonix      #HLD  1. check lipids        #HTN  1. asa 81 mg  2. lisinopril 20 mg  3. HCTZ 12.5 mg  3. metoprol 100 mg decreased to 50 mg BID w parameters        IMPROVE VTE Individual Risk Assessment    RISK                                                                Points    [  ] Previous VTE                                                  3    [  ] Thrombophilia                                               2    [  ] Lower limb paralysis                                      2        (unable to hold up >15 seconds)      [  ] Current Cancer                                              2         (within 6 months)    [  ] Immobilization > 24 hrs                                1    [  ] ICU/CCU stay > 24 hours                              1    [ X ] Age > 60                                                      1    IMPROVE VTE Score ______1___    IMPROVE Score 0-1: Low Risk, No VTE prophylaxis required for most patients, encourage ambulation.   IMPROVE Score 2-3: At risk, pharmacologic VTE prophylaxis is indicated for most patients (in the absence of a contraindication)  IMPROVE Score > or = 4: High Risk, pharmacologic VTE prophylaxis is indicated for most patients (in the absence of a contraindication)

## 2020-07-03 NOTE — GOALS OF CARE CONVERSATION - ADVANCED CARE PLANNING - CONVERSATION DETAILS
Goals of Care discussed at length with the patient. This conversation included current condition, prognosis, and options for therapy. Discussed desires by patient for further care and wishes were expressed - curative measures desired . Advanced directives discussed - FULL CODE, he and his wife have discussed it but have not made definitive life plans at this time. No Family present at the bedside.

## 2020-07-03 NOTE — CONSULT NOTE ADULT - ASSESSMENT
A/P: 79 y/o M with a PMHx of HTN, HLD, and GERD who presented to the ED with complaitns of weakness, dizziness, and chest burning. Patient states he hasn't been feeling well in general for th last few days, but yesterday afternoon he walked outside to talk to his wife. By the time he walked back into the house he was feeling week, dizzy, and with some chest burning. Patient states that the chest burning was 1/10, similar to his GERD symptoms. Patient states he went to an Urgent care who did an EKG, and referred him to the ED. Patient also notes recent tick bite, but lyme's panel in the ED was negative. Patient states his METS>4, but now with fatigue when going upstairs. Patient denies fevers, chills, SOB, orthopnea, PND, LE edema, abdominal pain, N/V/D, headache, or vision changes.   Troponin neg x 3  BNP 1100    Chest Pain  - Atypical chest pain.   - Troponins negative x 3  - BNP elevated.   - Patient also noting fatigue with exertion, concern for ischemia.   - Obtain TTE  - Admit to hospitalist service.   - Pending echo results will plan for ischemic eval Monday.   - Continue aspirin and statin.     Sinus Bradycardia   - Patient with sinus bradycardia and PACs.   - On metoprolol 100mg PO BID at home, would decrease to 50mg PO BID.   - Continue telemetry monitoring.     HTN  - BP is well controlled.   - Decrease metoprolol as above.   - Continue lisinopril- HCTZ.     Preliminary evaluation, please await official recommendations by Dr. Jacobs.

## 2020-07-03 NOTE — DISCHARGE NOTE PROVIDER - HOSPITAL COURSE
80 year old male w GERD, HLD, HTN presented to ED c/o weakness, chest discomfort        He states that he has been feeling generally unwell for the past few days, with easy fatigue and general weakness. Does not recall a specific onset of symptoms or provoking factor.     Endorses intermittent burning type chest pain, occasional palpitations. Seen initially at urgent care 07-, referred to ED for cardiology evaluation.        Came to ED 20:03 07-02 w /48  HR 50  RR 19  T 98       trop neg x 3    cardiology consulted    tte showed preserved ef        patient adamently wanteed to go home and come back for a cath, spoke to cardio and cleared for dc and staff informef patient he would have to come back next week for cath    patient also advised to come back with any firther chest pain        time spent on dc 34min

## 2020-07-03 NOTE — ED CDU PROVIDER INITIAL DAY NOTE - MEDICAL DECISION MAKING DETAILS
79yo M with bradycardia -HR low 50s, skipped beats on monitor. Plan to keep in obs for serial trops/ekgs and [facility] Cardiology consult in AM

## 2020-07-03 NOTE — DISCHARGE NOTE NURSING/CASE MANAGEMENT/SOCIAL WORK - PATIENT PORTAL LINK FT
You can access the FollowMyHealth Patient Portal offered by [facility] by registering at the following website: [email or web address]. By joining [facility]’s FollowMyHealth portal, you will also be able to view your health information using other applications (apps) compatible with our system.

## 2020-07-03 NOTE — ED ADULT NURSE REASSESSMENT NOTE - NS ED NURSE REASSESS COMMENT FT1
Pt A&Ox4 in NAD. respirations even and unlabored. ambulates without difficulty. CM In place SB HR 46 at this time. pt offering no complaints, resting comfortably.

## 2020-07-03 NOTE — H&P ADULT - HISTORY OF PRESENT ILLNESS
80 year old male w GERD, HLD, HTN presented to ED c/o weakness, chest discomfort    He states that he has been feeling generally unwell for the past few days, with easy fatigue and general weakness. Does not recall a specific onset of symptoms or provoking factor.   Endorses intermittent burning type chest pain, occasional palpitations. Seen initially at urgent care 2020, referred to ED for cardiology evaluation.  He has otherwise been in usual state of health, denies any recent illness. Reports a tick bite a few weeks ago, had some surrounding erythema but denies any target rash. No other cardiac history.    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        SOCIAL HX  Lives with his wife.   Former smoker, quit 50 years ago. Smoked 1 PPD x 10 years.   Drinks wine with dinner.   Denies drugs  Works at [employer]

## 2020-07-04 LAB
CULTURE RESULTS: NO GROWTH — SIGNIFICANT CHANGE UP
SARS-COV-2 IGG SERPL QL IA: NEGATIVE — SIGNIFICANT CHANGE UP
SARS-COV-2 IGM SERPL IA-ACNC: 0.12 INDEX — SIGNIFICANT CHANGE UP
SPECIMEN SOURCE: SIGNIFICANT CHANGE UP

## 2020-07-08 LAB
A PHAGOCYTOPH IGG TITR SER IF: SIGNIFICANT CHANGE UP TITER
B BURGDOR AB SER QL IA: SIGNIFICANT CHANGE UP
B MICROTI IGG TITR SER: SIGNIFICANT CHANGE UP TITER
E CHAFFEENSIS IGG TITR SER IF: SIGNIFICANT CHANGE UP TITER

## 2022-12-28 NOTE — ED ADULT NURSE NOTE - CADM POA CENTRAL LINE
Caller: Shereen Alanis    Relationship: Self    Best call back number: [phone]    What is the best time to reach you: ANYTI    Who are you requesting to speak with (clinical staff, provider,  specific staff member): NURSE    What was the call regarding: PATIENT IS CALLING IN REGARDS TO LAB RESULTS.     Do you require a callback: YES            Giuseppe Sheldon presents today for   Chief Complaint   Patient presents with    Follow-up       Giuseppe Sheldon preferred language for health care discussion is english/other. Personal Protective Equipment:   Personal Protective Equipment was used including: mask-surgical and hands-gloves. Patient was placed on no precaution(s). Patient was masked. Precautions:   Patient currently on None  Patient currently roomed with door closed    Is someone accompanying this pt? no    Is the patient using any DME equipment during [address]? no    Depression Screening:  3 most recent PHQ Screens 5/12/2021   Little interest or pleasure in doing things Not at all   Feeling down, depressed, irritable, or hopeless Not at all   Total Score PHQ 2 0       Learning Assessment:  Learning Assessment 5/18/2016   PRIMARY LEARNER Patient   HIGHEST LEVEL OF EDUCATION - PRIMARY LEARNER  SOME COLLEGE   BARRIERS PRIMARY LEARNER NONE   PRIMARY LANGUAGE ENGLISH   LEARNER PREFERENCE PRIMARY LISTENING   ANSWERED BY patient   RELATIONSHIP SELF       Abuse Screening:  Abuse Screening Questionnaire 5/18/2016   Do you ever feel afraid of your partner? N   Are you in a relationship with someone who physically or mentally threatens you? N   Is it safe for you to go home? Y       Fall Risk  Completed    Pt currently taking Anticoagulant therapy? no    Coordination of Care:  1. Have you been to the ER, urgent care clinic since your last visit? Hospitalized since your last visit? no    2. Have you seen or consulted any other health care providers outside of the [address] [place] [address] since your last visit? Include any pap smears or colon screening.  no No

## 2023-08-01 NOTE — ED ADULT NURSE NOTE - NURSING NEURO LEVEL OF CONSCIOUSNESS
Initial SW/CM Assessment/Plan of Care Note     Baseline Assessment  49 year old admitted 7/31/2023 as Inpatient with a diagnosis of Hyponatremia.   Prior to admission patient was living with Parent and residing at House    . Patient’s Primary Care Provider is Dax Lopez MD.     Progress Note  Assessment completed w/ pt. Pt is axox4 and ambulatory w/o assistance. Pt resides at address on record w/ his mother. Pt states he was independent w/ ADL's prior to admission. Pt declined need for additional services at home. Pt reports being a daily drinker. SW offered treatment options pt declined. Pt confirmed PCP. However, states he is unaware when he last seen his PCP. Final dc plan is home, once pt is medically stable.     Plan  Patient/Family Discharge Goal: Home   Is patient/family goal achievable: Yes    SW/CM - Recommendations for Discharge: Home   PT - Recommendations for Discharge:      Last Filed Values     None        OT - Recommendations for Discharge:      Last Filed Values     None        SLP - Recommendations for Discharge:    Last Filed Values     None          Barriers to Discharge  Identified Barriers to Discharge/Transition Planning:          Anticipate patient will need post-hospital services. Necessary services are available.  Anticipate patient can return to the environment from which patient entered the hospital.   Anticipate patient can provide self-care at discharge.    Refer to /CM Flowsheet for objective data.     Medical History  Past Medical History:   Diagnosis Date   • Diabetes mellitus (CMD)    • Essential (primary) hypertension    • ETOH abuse    • Gastroesophageal reflux disease        Prior to Admission Status  Functional Status  Ambulation: Independent/Self  Bathing: Independent/Self  Dressing: Independent/Self  Toileting: Independent/Self  Meal Preparation: Independent/Self  Shopping: Independent/Self  Medication Preparation: Independent/Self  Medication Administration:  Independent/Self  Housekeeping: Independent/Self  Laundry: Independent/Self  Laundry Location: Main Level  Transportation: Independent/Self    Agency/Support  Type of Services Prior to Hospitalization: None ,   ,   ,   ,    ,     Support Systems: Family members   Home Devices/Equipment: None ,   ,    ,      Mobility Assist Devices: None  Sensory Support Devices: None      Current Status  PT Ambulation Tips:    PT Transfer Tips:     OT Bathing Tips:    OT Dressing Tips:    OT Toileting Tips:    OT Feeding Tips:    SLP Swallow/Feeding Tips:    SLP Comm/Cog Tips:    Current Mental Status: Oriented to Time, Oriented to Reason for Hospitalization, Oriented to Place, Oriented to Person, Alert  Stressors:      Insurance  Primary: [facility] HEALTH PLAN  Secondary: N/A    Disposition Recommendations:  SW/CM recommendation for discharge: Home          alert and awake

## 2023-09-11 NOTE — CONSULT NOTE ADULT - ATTENDING COMMENTS
TTE reviewed. Normal LV function. Moderate AI.   Chest pain with exertion- concerning for angina.   Needs LHC/RHC on monday.   bradycardia- decrease metoprolol to 50 mg po bid which could explain fatigue and dizziness.  Aspirin 81 mg.   No heparin drip since no sxs at rest.   Lasix 20 mg po qd for BNP elevation and mild volume overload.
yes

## 2024-08-01 NOTE — ED ADULT TRIAGE NOTE - WEIGHT IN LBS
Patient called to let Jose know what the neurosurgery recommended.  Patient states he seen Alan Cortez PA-C.  Patient states he recommended doing the injections in the back again.  Patient seen Dr. Campbell for this in the past.  He will need another referral.  Any questions please contact patient.  Office note is in care everywhere.  Patient was seen on 7/29/24 with Alan Coretz PA-C.      Health Maintenance   Topic Date Due    Hepatitis B vaccine (1 of 3 - 3-dose series) Never done    Pneumococcal 0-64 years Vaccine (1 of 2 - PCV) Never done    Hepatitis A vaccine (1 of 2 - Risk 2-dose series) Never done    Colorectal Cancer Screen  Never done    Lipids  08/20/2021    GFR test (Diabetes, CKD 3-4, OR last GFR 15-59)  08/20/2021    Diabetic retinal exam  11/01/2022    COVID-19 Vaccine (3 - 2023-24 season) 09/01/2023    Diabetic foot exam  05/22/2024    Diabetic Alb to Cr ratio (uACR) test  05/22/2024    Flu vaccine (1) Never done    Depression Screen  03/14/2025    A1C test (Diabetic or Prediabetic)  06/17/2025    DTaP/Tdap/Td vaccine (2 - Td or Tdap) 07/31/2026    HIV screen  Completed    Hib vaccine  Aged Out    Polio vaccine  Aged Out    Meningococcal (ACWY) vaccine  Aged Out             (applicable per patient's age: Cancer Screenings, Depression Screening, Fall Risk Screening, Immunizations)    Hemoglobin A1C (%)   Date Value   06/17/2024 7.7   03/14/2024 8.2   05/22/2023 6.0     AST (U/L)   Date Value   08/20/2020 24     ALT (U/L)   Date Value   08/20/2020 24     BUN (mg/dL)   Date Value   08/20/2020 15      (goal A1C is < 7)   (goal LDL is <100) need 30-50% reduction from baseline     BP Readings from Last 3 Encounters:   06/17/24 110/76   03/14/24 114/76   10/10/23 112/80    (goal /80)      All Future Testing planned in [facility]:  Lab Frequency Next Occurrence   Vascular ankle brachial index (ERNESTO) Once 06/17/2024       Next Visit Date:  Future Appointments   Date Time Provider Department Center  160
